# Patient Record
Sex: MALE | Race: WHITE | URBAN - METROPOLITAN AREA
[De-identification: names, ages, dates, MRNs, and addresses within clinical notes are randomized per-mention and may not be internally consistent; named-entity substitution may affect disease eponyms.]

---

## 2021-06-22 ENCOUNTER — OFFICE VISIT (OUTPATIENT)
Dept: URBAN - METROPOLITAN AREA CLINIC 26 | Facility: CLINIC | Age: 71
End: 2021-06-22
Payer: MEDICARE

## 2021-06-22 DIAGNOSIS — H25.12 AGE-RELATED NUCLEAR CATARACT, LEFT EYE: ICD-10-CM

## 2021-06-22 DIAGNOSIS — Z96.1 PRESENCE OF PSEUDOPHAKIA: ICD-10-CM

## 2021-06-22 PROCEDURE — 99203 OFFICE O/P NEW LOW 30 MIN: CPT | Performed by: STUDENT IN AN ORGANIZED HEALTH CARE EDUCATION/TRAINING PROGRAM

## 2021-06-22 PROCEDURE — 92134 CPTRZ OPH DX IMG PST SGM RTA: CPT | Performed by: STUDENT IN AN ORGANIZED HEALTH CARE EDUCATION/TRAINING PROGRAM

## 2021-06-22 ASSESSMENT — INTRAOCULAR PRESSURE
OS: 13
OD: 18

## 2021-06-22 NOTE — IMPRESSION/PLAN
Impression: Nonexudative macular degeneration, advanced atrophic with subfoveal involvement, bilateral: H35.4338. Plan: Patient educated on condition. Educated patient on importance of monitoring with [place] grid at home and to continue taking [employer]'s vitamin, wearing UV protection, and adding leafy greens to diet. Progression of GA most likely cause of reduced vision OS. Cataract is not visually significant at this point. 

Recommended patient be seen by retina in next 3mo, patient will look for retinal specialist in [place]

## 2021-09-08 ENCOUNTER — OFFICE VISIT (OUTPATIENT)
Dept: URBAN - METROPOLITAN AREA CLINIC 24 | Facility: CLINIC | Age: 71
End: 2021-09-08
Payer: MEDICARE

## 2021-09-08 DIAGNOSIS — H35.3221 EXDTVE AGE-REL MCLR DEGN, LEFT EYE, WITH ACTV CHRDL NEOVAS: ICD-10-CM

## 2021-09-08 PROCEDURE — 99204 OFFICE O/P NEW MOD 45 MIN: CPT | Performed by: OPHTHALMOLOGY

## 2021-09-08 PROCEDURE — 92134 CPTRZ OPH DX IMG PST SGM RTA: CPT | Performed by: OPHTHALMOLOGY

## 2021-09-08 PROCEDURE — 67028 INJECTION EYE DRUG: CPT | Performed by: OPHTHALMOLOGY

## 2021-09-08 ASSESSMENT — INTRAOCULAR PRESSURE
OD: 17
OS: 17

## 2021-09-08 NOTE — IMPRESSION/PLAN
Impression: Hypertensive retinopathy OU Plan: URGED pt to STOP SMOKING. High-resolution imaging / retinal examination confirm Central Retinal Vein Occlusion (CRVO). RETINA will provide vigilant attention to retinal issues. Non-retinal care continue w primary eye team.  PCP  help w systemic risk factors (seema. HTN) important. Venous occlusion may cause permanent VA change despite attentive retina care. SEE OTHER PLAN.    CHECK BP ALWAYS

## 2021-09-08 NOTE — IMPRESSION/PLAN
Impression: LEFT eye CRVO w CME IMPOSED on AMD dry Plan: NEW LEFT Fall '21 NEW CRVO -- geographic atrophy AMD severe    SUPERIMPOSED CRVO w CME -- DROP in [address] and HMG / fluid limiting. TODAY Avastin OS (proc note - not study candidate) RTC 4-6w HRA baseline and plan Avastin OS #2/6 initial.  Future ND2 ? New Avastin injection given for retinal edema / disease activity. All r/b/a reviewed. Off-label use in eye of FDA-approved drug. Theoretical low (but not zero) risk of adverse ocular event or ATE --understood and accepted. Costs / expense / insurance / risk understood -accepted. Careful review of pt case, current imaging, known pathophysiology of condition, consider imaging upon next return as noted above.   PRINCE

## 2021-09-08 NOTE — IMPRESSION/PLAN
Impression: AMD Advanced atrophic  OU Plan: Prior eval Dr. Fe Hernandez. PLAN WAS for patient to look for retinal specialist in [place] NOW Fall '21 back to [address]   NEW issue is WET AMD OS worse VA

## 2021-10-18 ENCOUNTER — OFFICE VISIT (OUTPATIENT)
Dept: URBAN - METROPOLITAN AREA CLINIC 30 | Facility: CLINIC | Age: 71
End: 2021-10-18
Payer: MEDICARE

## 2021-10-18 DIAGNOSIS — H34.8120 CENTRAL RETINAL VEIN OCCLUSION, LEFT EYE, WITH MACULAR EDEMA: Primary | ICD-10-CM

## 2021-10-18 DIAGNOSIS — H35.033 HYPERTENSIVE RETINOPATHY, BILATERAL: ICD-10-CM

## 2021-10-18 PROCEDURE — 92134 CPTRZ OPH DX IMG PST SGM RTA: CPT | Performed by: OPHTHALMOLOGY

## 2021-10-18 PROCEDURE — 67028 INJECTION EYE DRUG: CPT | Performed by: OPHTHALMOLOGY

## 2021-10-18 PROCEDURE — 92250 FUNDUS PHOTOGRAPHY W/I&R: CPT | Performed by: OPHTHALMOLOGY

## 2021-10-18 PROCEDURE — 92242 FLUORESCEIN&ICG ANGIOGRAPHY: CPT | Performed by: OPHTHALMOLOGY

## 2021-10-18 ASSESSMENT — INTRAOCULAR PRESSURE
OD: 19
OS: 21

## 2021-10-18 NOTE — IMPRESSION/PLAN
Impression: Hypertensive retinopathy OU URGED pt to STOP SMOKING. Plan: High-resolution imaging / retinal examination confirm Central Retinal Vein Occlusion (CRVO). RETINA will provide vigilant attention to retinal issues. Non-retinal care continue w primary eye team.  PCP  help w systemic risk factors (seema. HTN) important. Venous occlusion may cause permanent VA change despite attentive retina care. SEE OTHER PLAN. CHECK BP ALWAYS   URGED pt to STOP SMOKING.

## 2021-10-18 NOTE — IMPRESSION/PLAN
Impression: LEFT eye CRVO w CME IMPOSED on AMD dry - trial inj Plan: NEW LEFT Fall '21 NEW CRVO -- geographic atrophy AMD severe    SUPERIMPOSED CRVO w CME -- DROP in [address] and HMG / fluid limiting. TODAY Avastin OS (proc note - not study candidate) RTC 4-6w ND1/inj/pos OCT - plan Avastin OS #3/6 initial.  Future ND2?

## 2021-12-13 ENCOUNTER — PROCEDURE (OUTPATIENT)
Dept: URBAN - METROPOLITAN AREA CLINIC 30 | Facility: CLINIC | Age: 71
End: 2021-12-13
Payer: MEDICARE

## 2021-12-13 PROCEDURE — 67028 INJECTION EYE DRUG: CPT | Performed by: OPHTHALMOLOGY

## 2021-12-13 PROCEDURE — 92134 CPTRZ OPH DX IMG PST SGM RTA: CPT | Performed by: OPHTHALMOLOGY

## 2021-12-13 ASSESSMENT — INTRAOCULAR PRESSURE
OS: 21
OD: 20

## 2021-12-13 NOTE — IMPRESSION/PLAN
Impression: AMD Advanced atrophic  OU Plan: Prior eval Dr. Eliazar Tierney. PLAN WAS for patient to look for retinal specialist in [place] NOW Fall '21 back to [address] NEW issue is CRVO w HMG OS.    ATROPHY is LIMITING ISSUE OU

## 2021-12-13 NOTE — IMPRESSION/PLAN
Impression: LEFT eye CRVO w CME IMPOSED on AMD ATROPHY Plan: NEW LEFT Fall '21 NEW CRVO -- geographic atrophy AMD severe    SUPERIMPOSED CRVO w CME -- DROP in [address] and HMG / fluid limiting. ATROPHY is LIMITING ISSUE -- SEVERE pre-existing TODAY Avastin OS (proc note - not study candidate) RTC 4-6w ND2/inj/pos OCT - plan Avastin OS #4/6 initial.  Future exam/ND2?

## 2022-01-24 ENCOUNTER — OFFICE VISIT (OUTPATIENT)
Dept: URBAN - METROPOLITAN AREA CLINIC 30 | Facility: CLINIC | Age: 72
End: 2022-01-24
Payer: MEDICARE

## 2022-01-24 DIAGNOSIS — H35.3134 NONEXUDATIVE MACULAR DEGENERATION, ADVANCED ATROPHIC WITH SUBFOVEAL INVOLVEMENT, BILATERAL: ICD-10-CM

## 2022-01-24 PROCEDURE — 67028 INJECTION EYE DRUG: CPT | Performed by: OPHTHALMOLOGY

## 2022-01-24 PROCEDURE — 99214 OFFICE O/P EST MOD 30 MIN: CPT | Performed by: OPHTHALMOLOGY

## 2022-01-24 PROCEDURE — 92134 CPTRZ OPH DX IMG PST SGM RTA: CPT | Performed by: OPHTHALMOLOGY

## 2022-01-24 ASSESSMENT — INTRAOCULAR PRESSURE
OD: 17
OS: 19

## 2022-01-24 NOTE — IMPRESSION/PLAN
Impression: LEFT eye CRVO w CME  -- 
  IMPOSED on AMD ATROPHY Plan: hx: [[NEW LEFT Fall '21 NEW CRVO -- geographic atrophy AMD severe    SUPERIMPOSED CRVO w CME -- DROP in [address] and HMG / fluid limiting]]. ATROPHY is LIMITING ISSUE -- SEVERE pre-existing  ATROPHY
      TODAY Avastin OS (proc note - not study candidate)   Future HRA/ND2?  
        RTC  6w  dil, OCT, eval - h/o  Avastin OS #5 initial 
     (GOAL is not VA improve -- it is AVOID BPE / NLP / Pain)

## 2022-03-07 ENCOUNTER — OFFICE VISIT (OUTPATIENT)
Dept: URBAN - METROPOLITAN AREA CLINIC 30 | Facility: CLINIC | Age: 72
End: 2022-03-07
Payer: MEDICARE

## 2022-03-07 PROCEDURE — 92134 CPTRZ OPH DX IMG PST SGM RTA: CPT | Performed by: OPHTHALMOLOGY

## 2022-03-07 PROCEDURE — 67028 INJECTION EYE DRUG: CPT | Performed by: OPHTHALMOLOGY

## 2022-03-07 PROCEDURE — 99214 OFFICE O/P EST MOD 30 MIN: CPT | Performed by: OPHTHALMOLOGY

## 2022-03-07 ASSESSMENT — INTRAOCULAR PRESSURE
OD: 17
OS: 22

## 2022-03-07 NOTE — IMPRESSION/PLAN
Impression: AMD Advanced atrophic  OU Plan: Prior eval Dr. Peace Lilly. PLAN WAS for patient to look for retinal specialist in [place] Fall '21 back to [address]   NEW issue is CRVO w HMG OS. 
   TODAY repeated eval shows  ATROPHY is LIMITING ISSUE OU

## 2022-03-07 NOTE — IMPRESSION/PLAN
Impression: LEFT eye CRVO w CME  -- 
  IMPOSED on AMD ATROPHY Plan: hx: [[NEW LEFT Fall '21 NEW CRVO -- geographic atrophy AMD severe    SUPERIMPOSED CRVO w CME -- DROP in [address] and HMG / fluid limiting]]. ATROPHY is LIMITING ISSUE -- SEVERE pre-existing  ATROPHY
      TODAY Avastin OS (proc note - not study candidate)   Future HRA/ND2?  
        RTC  6w ND1 inj/OCT plan  Avastin OS #6 initial  - May be in [place] 
     (GOAL is not VA improve -- it is AVOID BPE / NLP / Pain)

## 2022-05-09 ENCOUNTER — OFFICE VISIT (OUTPATIENT)
Dept: URBAN - METROPOLITAN AREA CLINIC 24 | Facility: CLINIC | Age: 72
End: 2022-05-09
Payer: MEDICARE

## 2022-05-09 DIAGNOSIS — H35.3134 NONEXUDATIVE MACULAR DEGENERATION, ADVANCED ATROPHIC WITH SUBFOVEAL INVOLVEMENT, BILATERAL: ICD-10-CM

## 2022-05-09 DIAGNOSIS — H34.8120 CENTRAL RETINAL VEIN OCCLUSION, LEFT EYE, WITH MACULAR EDEMA: Primary | ICD-10-CM

## 2022-05-09 PROCEDURE — 67028 INJECTION EYE DRUG: CPT | Performed by: OPHTHALMOLOGY

## 2022-05-09 PROCEDURE — 92134 CPTRZ OPH DX IMG PST SGM RTA: CPT | Performed by: OPHTHALMOLOGY

## 2022-05-09 ASSESSMENT — INTRAOCULAR PRESSURE
OD: 19
OS: 19

## 2022-05-09 NOTE — IMPRESSION/PLAN
Impression: LEFT eye CRVO w CME  -- 
  IMPOSED on AMD ATROPHY Plan: hx: [[NEW LEFT Fall '21 NEW CRVO -- geographic atrophy AMD severe    SUPERIMPOSED CRVO w CME -- DROP in [address] and HMG / fluid limiting]]. ATROPHY is LIMITING ISSUE -- SEVERE pre-existing  ATROPHY
     TODAY Avastin OS (proc note - not study candidate)   Future HRA/ND2? RTC  6w ND2 inj/OCT plan  Avastin OS #7 initial  - May be in [place] 
      (GOAL is not VA improve -- it is AVOID BPE / NLP / Pain) NOTE . Burgess Carlyle Ramonwell BCVA testing suggests Ce/IOL MAY offer some help? - ALSO, [facility] aides / coaching / OT training may help?

## 2022-05-09 NOTE — IMPRESSION/PLAN
Impression: AMD Advanced atrophic  OU Plan: Prior eval Dr. Ozzy Walls. PLAN WAS for patient to look for retinal specialist in [place] Fall '21 back to [address] NEW issue is CRVO w HMG OS.      ATROPHY is LIMITING ISSUE OU

## 2022-05-09 NOTE — IMPRESSION/PLAN
Impression: PCIOL OD.  NSC left eye: H25.12. Plan: Cataract OS -- NOTE . Debbora Ice Hero Lobe BCVA testing suggests Ce/IOL MAY offer some help? - YES, overbook for Ce/IOL plan w eyeMD Directly for expediency. AFTERWARDS -- [facility] aides / coaching / OT training may help?

## 2022-05-16 ENCOUNTER — TESTING ONLY (OUTPATIENT)
Dept: URBAN - METROPOLITAN AREA CLINIC 24 | Facility: CLINIC | Age: 72
End: 2022-05-16
Payer: MEDICARE

## 2022-05-16 ASSESSMENT — PACHYMETRY
OD: 4.24
OS: 22.56
OS: 2.82
OD: 22.26

## 2022-05-18 ENCOUNTER — OFFICE VISIT (OUTPATIENT)
Dept: URBAN - METROPOLITAN AREA CLINIC 24 | Facility: CLINIC | Age: 72
End: 2022-05-18
Payer: MEDICARE

## 2022-05-18 DIAGNOSIS — H52.222 REGULAR ASTIGMATISM, LEFT EYE: ICD-10-CM

## 2022-05-18 DIAGNOSIS — H25.12 AGE-RELATED NUCLEAR CATARACT, LEFT EYE: Primary | ICD-10-CM

## 2022-05-18 PROCEDURE — 99214 OFFICE O/P EST MOD 30 MIN: CPT | Performed by: OPHTHALMOLOGY

## 2022-05-18 ASSESSMENT — KERATOMETRY
OS: 43.95
OD: 44.04

## 2022-05-18 ASSESSMENT — VISUAL ACUITY
OS: 20/80
OD: 20/400

## 2022-05-18 ASSESSMENT — INTRAOCULAR PRESSURE
OS: 18
OD: 18

## 2022-05-18 NOTE — IMPRESSION/PLAN
Impression: Age-related nuclear cataract, left eye: H25.12. Plan: (( ((OS ONLY AIM -0.25 OU:  + DEXYCU, +Topical ((No Block), (+) ORA, (+)LenSx, (+) TORIC)) -15 Min *Patient wants to be out of glasses as much as possible*
(NO Multi focal 2/2 Retinal complications) Discussed cataract diagnosis with the patient. Appropriate testing ordered for cataract diagnosis prior to Preop. Risks and benefits of surgical treatment were discussed and understood. Patient desires surgical treatment. Discussed lens options with pt and pt is ok with wearing glasses after surgery. Patient desires surgery to proceed with surgery  OS  . Both eyes examined, medically necessary due to impact in activities of daily living. Discussed higher risks with smaller pupil and discussed iris stretch and higher risks of bleeding.  Discussed there is a chance of developing capsular haze after surgery, which may be corrected with laser/yag

## 2022-05-18 NOTE — IMPRESSION/PLAN
Impression: Regular astigmatism, left eye: H52.222. Plan: Patient opts for a toric lens, Wants to be out of glasses as much as possible (Aware a toric is an upgraded lens with an Out of Pocket cost)

## 2022-06-13 ENCOUNTER — OFFICE VISIT (OUTPATIENT)
Dept: URBAN - METROPOLITAN AREA CLINIC 30 | Facility: CLINIC | Age: 72
End: 2022-06-13
Payer: MEDICARE

## 2022-06-13 DIAGNOSIS — H25.12 AGE-RELATED NUCLEAR CATARACT, LEFT EYE: ICD-10-CM

## 2022-06-13 DIAGNOSIS — H34.8120 CENTRAL RETINAL VEIN OCCLUSION, LEFT EYE, WITH MACULAR EDEMA: Primary | ICD-10-CM

## 2022-06-13 PROCEDURE — 92134 CPTRZ OPH DX IMG PST SGM RTA: CPT | Performed by: OPHTHALMOLOGY

## 2022-06-13 PROCEDURE — 67028 INJECTION EYE DRUG: CPT | Performed by: OPHTHALMOLOGY

## 2022-06-13 ASSESSMENT — INTRAOCULAR PRESSURE
OD: 16
OS: 20

## 2022-06-13 NOTE — IMPRESSION/PLAN
Impression: LEFT eye CRVO w CME  -- 
  IMPOSED on AMD ATROPHY Plan: hx: [[NEW LEFT Fall '21 NEW CRVO -- geographic atrophy AMD severe    SUPERIMPOSED CRVO w CME -- DROP in [address] and HMG / fluid limiting]]. ATROPHY is LIMITING ISSUE -- SEVERE pre-existing  ATROPHY
     TODAY Avastin OS (proc note - not study candidate)   Future HRA/ND2? RTC  7-8w extend dil, OCT, eval - h/o Avastin OS #8 initial  - May be in [address] [place] [address] 
      (GOAL is not VA improve -- it is AVOID BPE / NLP / Pain) NOTE . Kasie Sahu Hazy BCVA testing suggests Ce/IOL MAY offer some help? - ALSO, [address] [facility] aides / coaching / OT training may help?

## 2022-06-13 NOTE — IMPRESSION/PLAN
Impression: Age-related nuclear cataract, left Plan: OK to plan Ce/IOL surgery -- Proceed.   This will help some though retina limiting

## 2022-06-14 ENCOUNTER — ADULT PHYSICAL (OUTPATIENT)
Dept: URBAN - METROPOLITAN AREA CLINIC 24 | Facility: CLINIC | Age: 72
End: 2022-06-14
Payer: MEDICARE

## 2022-06-14 DIAGNOSIS — Z01.818 ENCOUNTER FOR OTHER PREPROCEDURAL EXAMINATION: Primary | ICD-10-CM

## 2022-06-14 PROCEDURE — 99202 OFFICE O/P NEW SF 15 MIN: CPT | Performed by: PHYSICIAN ASSISTANT

## 2022-06-17 ENCOUNTER — TESTING ONLY (OUTPATIENT)
Dept: URBAN - METROPOLITAN AREA CLINIC 24 | Facility: CLINIC | Age: 72
End: 2022-06-17
Payer: MEDICARE

## 2022-06-20 ENCOUNTER — SURGERY (OUTPATIENT)
Dept: URBAN - METROPOLITAN AREA SURGERY 5 | Facility: SURGERY | Age: 72
End: 2022-06-20
Payer: MEDICARE

## 2022-06-20 DIAGNOSIS — H52.222 REGULAR ASTIGMATISM, LEFT EYE: ICD-10-CM

## 2022-06-20 DIAGNOSIS — H25.12 AGE-RELATED NUCLEAR CATARACT, LEFT EYE: Primary | ICD-10-CM

## 2022-06-20 PROCEDURE — PR1CC PR1CC: CUSTOM | Performed by: OPHTHALMOLOGY

## 2022-06-20 PROCEDURE — 66984 XCAPSL CTRC RMVL W/O ECP: CPT | Performed by: OPHTHALMOLOGY

## 2022-06-21 ENCOUNTER — POST-OPERATIVE VISIT (OUTPATIENT)
Dept: URBAN - METROPOLITAN AREA CLINIC 24 | Facility: CLINIC | Age: 72
End: 2022-06-21
Payer: MEDICARE

## 2022-06-21 DIAGNOSIS — Z48.810 ENCOUNTER FOR SURGICAL AFTERCARE FOLLOWING SURGERY ON A SENSE ORGAN: Primary | ICD-10-CM

## 2022-06-21 PROCEDURE — 99024 POSTOP FOLLOW-UP VISIT: CPT | Performed by: OPTOMETRIST

## 2022-06-21 ASSESSMENT — INTRAOCULAR PRESSURE: OS: 20

## 2022-06-21 NOTE — IMPRESSION/PLAN
Impression: S/P Cataract Extraction/IOL/ORA/LENSX/TORIC OS - 1 Day. Encounter for surgical aftercare following surgery on a sense organ  Z48.810. Plan: PO instructions reviewed.  
Understands BCVA limited secondary to CRVO/AMD
No

## 2022-06-28 ENCOUNTER — POST-OPERATIVE VISIT (OUTPATIENT)
Dept: URBAN - METROPOLITAN AREA CLINIC 24 | Facility: CLINIC | Age: 72
End: 2022-06-28
Payer: MEDICARE

## 2022-06-28 DIAGNOSIS — Z48.810 ENCOUNTER FOR SURGICAL AFTERCARE FOLLOWING SURGERY ON A SENSE ORGAN: Primary | ICD-10-CM

## 2022-06-28 PROCEDURE — 99024 POSTOP FOLLOW-UP VISIT: CPT | Performed by: OPTOMETRIST

## 2022-06-28 ASSESSMENT — VISUAL ACUITY: OS: 20/80

## 2022-06-28 ASSESSMENT — INTRAOCULAR PRESSURE: OS: 9

## 2022-06-28 NOTE — IMPRESSION/PLAN
Impression: S/P Cataract Extraction/IOL/ORA/LENSX/TORIC OS - 8 Days. Encounter for surgical aftercare following surgery on a sense organ  Z48.810.  Plan: Pt unhappy with outcome OS, lengthy discussion with pt and son BCVA limited OS secondary to CRVO/AMD.